# Patient Record
Sex: FEMALE | URBAN - NONMETROPOLITAN AREA
[De-identification: names, ages, dates, MRNs, and addresses within clinical notes are randomized per-mention and may not be internally consistent; named-entity substitution may affect disease eponyms.]

---

## 2024-06-24 ENCOUNTER — APPOINTMENT (OUTPATIENT)
Dept: URBAN - NONMETROPOLITAN AREA CLINIC 54 | Age: 79
Setting detail: DERMATOLOGY
End: 2024-06-25

## 2024-06-24 DIAGNOSIS — L72.8 OTHER FOLLICULAR CYSTS OF THE SKIN AND SUBCUTANEOUS TISSUE: ICD-10-CM

## 2024-06-24 PROCEDURE — OTHER COUNSELING: OTHER

## 2024-06-24 PROCEDURE — OTHER NOTED ON EXAM BUT NOT TREATED: OTHER

## 2024-06-24 PROCEDURE — OTHER ADDITIONAL NOTES: OTHER

## 2024-06-24 PROCEDURE — 99202 OFFICE O/P NEW SF 15 MIN: CPT

## 2024-06-24 ASSESSMENT — LOCATION DETAILED DESCRIPTION DERM: LOCATION DETAILED: LEFT ANTERIOR PROXIMAL THIGH

## 2024-06-24 ASSESSMENT — LOCATION ZONE DERM: LOCATION ZONE: LEG

## 2024-06-24 ASSESSMENT — LOCATION SIMPLE DESCRIPTION DERM: LOCATION SIMPLE: LEFT THIGH

## 2024-06-24 NOTE — PROCEDURE: NOTED ON EXAM BUT NOT TREATED
Text: The above diagnosis and findings were noted on the exam but required no treatment.
Detail Level: Zone

## 2024-06-24 NOTE — HPI: CYST
Is This A New Presentation, Or A Follow-Up?: Cyst
Additional History: Pts pcp referred her to us for cyst in left groin area. Pt states that over the last 3-4 months she felt utterly was getting bigger but feels it has gotten smaller recently

## 2024-06-24 NOTE — PROCEDURE: ADDITIONAL NOTES
Visiting nurse called pt does not know what to do with her Metformin - now that it has been recalled  I called the pt and left a message stating she needed to take her bottle to the pharmacy and they will replace it with Metformin that has not been recalled    She is to call us if any questions
Additional Notes: Tried to express dried debris but unable to. Recommended warm compresses and expression as tolerated. No inflammation seen at this time.
Detail Level: Simple
Render Risk Assessment In Note?: no